# Patient Record
(demographics unavailable — no encounter records)

---

## 2024-11-15 NOTE — REVIEW OF SYSTEMS
[Nasal Discharge] : nasal discharge [Nasal Congestion] : nasal congestion [Cough] : cough [Irritable] : no irritability [Inconsolable] : consolable [Fussy] : not fussy [Eye Redness] : no eye redness [Ear Tugging] : no ear tugging [Tachypnea] : not tachypneic [Wheezing] : no wheezing [Intolerance to feeds] : tolerance to feeds [Spitting Up] : no spitting up [Constipation] : no constipation [Vomiting] : no vomiting [Diarrhea] : no diarrhea [Abnormal Movements] :  no abnormal movements [Jaundice] : no jaundice [Rash] : no rash [Dry Skin] : no dry skin

## 2024-11-15 NOTE — DISCUSSION/SUMMARY
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 Growth and development normal. PE remarkable for nasal congestion and erythema of nasal passages. Maternal depression screen passed. Immunizations UTD.  - Recommend close ENT follow up - Will refer to pediatric pulmonology due to h/o CF carrier  - Please use albuterol or saline nebulized as needed for severe cough or congestion - Routine care & anticipatory guidance given - Vaccines given: Pediarix, Hib, Prevnar & Rotarix - Post vaccine care discussed & potential side effects reviewed - Tylenol or Motrin 4.2ml every 4 hours prn for fever or pain - Continue ad herrera feeds and intro to solids, may advance to stage 2 baby foods - Choking hazards reviewed, no cows milk or honey until after age 1 year old - RTC for 9 month old HCM and prn  Caretaker expressed understanding of the plan and agrees. All questions were answered.

## 2024-11-15 NOTE — DEVELOPMENTAL MILESTONES
[Normal Development] : Normal Development [Pats or smiles at reflection] : pats or smiles at reflection [Begins to turn when name called] : begins to turn when name called [Babbles] : babbles [Rolls over prone to supine] : rolls over prone to supine [Sits briefly without support] : sits briefly without support [Reaches for object and transfers] : reaches for object and transfers [Rakes small object with 4 fingers] : rakes small object with 4 fingers [[place] small object on surface] : bangs small object on surface

## 2024-11-15 NOTE — PHYSICAL EXAM
[Alert] : alert [Normocephalic] : normocephalic [Flat Open Anterior [place]] : flat open anterior fontanelle [Conjunctivae with no discharge] : conjunctivae with no discharge [PERRL] : PERRL [EOMI Bilateral] : EOMI bilateral [Normally Placed Ears] : normally placed ears [Clear Tympanic membranes] : clear tympanic membranes [Light reflex present] : light reflex present [Discharge] : discharge [Nares Patent] : nares patent [Symmetric Chest Rise] : symmetric chest rise [Clear to Auscultation Bilaterally] : clear to auscultation bilaterally [Normoactive Precordium] : normoactive precordium [Regular Rate and Rhythm] : regular rate and rhythm [S1, S2 present] : S1, S2 present [Soft] : soft [Bowel Sounds] : bowel sounds present [Normal External Genitalia] : normal external genitalia [Acute Distress] : no acute distress [Excessive Tearing] : no excessive tearing [Murmurs] : no murmurs [Tender] : nontender [Distended] : nondistended [Syed-Ortolani] : negative Syed-Ortolani [Rash or Lesions] : no rash/lesions [de-identified] : + intranasal erythema

## 2024-11-15 NOTE — HISTORY OF PRESENT ILLNESS
[Formula ___ oz/feed] : [unfilled] oz of formula per feed [Formula ___ oz in 24hrs] : [unfilled] oz of formula in 24 hours [Hours between feeds ___] : Child is fed every [unfilled] hours [Fruits] : fruits [Vegetables] : vegetables [Normal] : Normal [___ voids per day] : [unfilled] voids per day [Frequency of stools: ___] : Frequency of stools: [unfilled]  stools [per day] : per day. [Green/brown] : green/brown [Pasty] : pasty [Firm] : firm consistency [In Bassinet/Crib] : sleeps in bassinet/crib [On back] : sleeps on back [Co-sleeping] : co-sleeping [Sleeps 12-16 hours per 24 hours (including naps)] : sleeps 12-16 hours per 24 hours (including naps) [Tummy time] : tummy time [No] : No cigarette smoke exposure [Water heater temperature set at <120 degrees F] : Water heater temperature set at <120 degrees F [Rear facing car seat in back seat] : Rear facing car seat in back seat [Carbon Monoxide Detectors] : Carbon monoxide detectors at home [Smoke Detectors] : Smoke detectors at home. [NO] : No [Cereal] : no cereal [Egg] : no egg [Meat] : no meat [Fish] : no fish [Peanut] : no peanut [Dairy] : no dairy [Loose bedding, pillow, toys, and/or bumpers in crib] : no loose bedding, pillow, toys, and/or bumpers in crib [Pacifier use] : not using pacifier [Screen time only for video chatting] : screen time not just for video chatting [Exposure to electronic nicotine delivery system] : No exposure to electronic nicotine delivery system [de-identified] : Enfamil gentlease, pureed: sweet potatoes, carrots, banana, peach, green beans, butternut squash, apple, strawberry [FreeTextEntry1] : cough and congestion since birth, saw ENT 1 month ago- inflammation in nasal passages, no laryngomalacia. Prescribed a nasal steroid drop.  Coughs every day, specially at night.   SDOH Screening Questionnaire   SDOH (Social Determinants of Health) Questionnaire: 1. Housing: Do you worry that in the upcoming months, your family, or child, may not have a safe or stable place to live? no 2. Food security: Within the last 12 months, did the food you bought not last and you did not have money to buy more? no 3. Community: Do you need help getting public benefits like food stamps or WIC? no 4. Transportation: Does your child have chronic medical condition and therefore struggle with transportation to attend medical appointments? no 5. Healthcare Access: Do you need help getting health or dental insurance? no       Result: Negative Screen. No further intervention needed.

## 2025-02-21 NOTE — REVIEW OF SYSTEMS
[Irritable] : irritability [Fussy] : fussy [Crying] : crying [Malaise] : malaise [Difficulty with Sleep] : difficulty with sleep [Fever] : fever [Nasal Discharge] : nasal discharge [Nasal Congestion] : nasal congestion [Snoring] : snoring [Mouth Breathing] : mouth breathing [Congestion] : congestion [Appetite Changes] : appetite changes [Rash] : rash [Negative] : Genitourinary [Inconsolable] : consolable [Eye Discharge] : no eye discharge [Eye Redness] : no eye redness [Increased Lacrimation] : no increased lacrimation [Ear Tugging] : no ear tugging [Swollen Gums] : no swollen gums [Tachypnea] : not tachypneic [Wheezing] : no wheezing [Cough] : no cough [Intolerance to feeds] : tolerance to feeds [Spitting Up] : no spitting up [Vomiting] : no vomiting [Diarrhea] : no diarrhea [Constipation] : no constipation [Gaseous] : not gaseous

## 2025-02-21 NOTE — HISTORY OF PRESENT ILLNESS
[Max Temp: ____] : Max temperature: [unfilled] [Fever] : FEVER [EENT/Resp Symptoms] : EENT/RESPIRATORY SYMPTOMS [FreeTextEntry4] : Improved with tylenol and motrin [de-identified] : Fever and congestion x 3 days [FreeTextEntry6] : 9-month-old female presenting with fever and congestion for the past 3 days. T max 103.3. Parents deny cough, vomiting, diarrhea. Parents admit to rash on posterior scalp, decreased appetite and disrupted sleep. Parents deny sick contacts at home.

## 2025-03-03 NOTE — PHYSICAL EXAM
[Warm] : warm [Maculopapular Eruption] : maculopapular eruption [Face] : face [Perioral Region] : perioral region [Cheeks] : cheeks [Neck] : neck [Trunk] : trunk [Arms] : arms [Hands] : hands [Legs] : legs [Feet] : feet [Intertriginous Region] : intertriginous region [Buttocks] : buttocks [NL] : warm, clear

## 2025-03-03 NOTE — DISCUSSION/SUMMARY
[FreeTextEntry1] : 9-month-old female presenting acutely with rash that began on Friday night after her fever broke and appeared on the face and spread down the body. She was seen in office last Friday and noted to have ear infection. She started amoxicillin last night, after development of rash. PE revealed erythematous nonblanching rash on face with associated excoriations and erythematous maculopapular rash to neck, torso, back, b/l arms and legs.  - RVP ordered for etiology of likely viral exanthem; will contact parents with results - CBC ordered for non blanching rash of face, rule out thrombocytopenia - Mother reports recent air travel to [place] and attending a conference of [phone]+ people, infant not yet vaccinated for MMR, will check IgM antibody - Continue with amoxicillin as prescribed and Tylenol/ Motrin prn - Continue to monitor for fevers - RTC 2 days for rash followup - RTC 2 weeks to follow up ear infection  Caretaker expressed understanding of the plan and agreed. All of their questions were answered.

## 2025-03-03 NOTE — HISTORY OF PRESENT ILLNESS
[Derm Symptoms] : DERM SYMPTOMS [___ Day(s)] : [unfilled] day(s) [Constant] : constant [FreeTextEntry7] : Head, torso, back, legs, arms, feet [de-identified] : Rash [FreeTextEntry6] : 9 month old female presenting with rash since Friday night. Patient was seen in our office last Friday afternoon with a fever and diagnosed with Viral URI. RVP/COVID panel was not done.  Fever has resolved and then a rash developed that began on the face and spread down the body. Parents believe that child has scratched at the rash which is why it appears worse on her face. Pt had [place] in the past. No sick contacts. Patient began taking amoxicillin last night. It was resent to the correct pharmacy after it was initially prescribed on 2/21 which is why they only started giving amoxicillin yesterday.  Her appetite and behavior is at her baseline. No difficulty breathing.

## 2025-03-03 NOTE — HISTORY OF PRESENT ILLNESS
[Derm Symptoms] : DERM SYMPTOMS [___ Day(s)] : [unfilled] day(s) [Constant] : constant [FreeTextEntry7] : Head, torso, back, legs, arms, feet [de-identified] : Rash [FreeTextEntry6] : 9 month old female presenting with rash since Friday night. Patient was seen in our office last Friday afternoon with a fever and diagnosed with Viral URI. RVP/COVID panel was not done.  Fever has resolved and then a rash developed that began on the face and spread down the body. Parents believe that child has scratched at the rash which is why it appears worse on her face. Pt had [place] in the past. No sick contacts. Patient began taking amoxicillin last night. It was resent to the correct pharmacy after it was initially prescribed on 2/21 which is why they only started giving amoxicillin yesterday.  Her appetite and behavior is at her baseline. No difficulty breathing.

## 2025-03-25 NOTE — HISTORY OF PRESENT ILLNESS
[Parents] : parents [Formula ___ oz/feed] : [unfilled] oz of formula per feed [Hours between feeds ___] : Child is fed every [unfilled] hours [Fruit] : fruit [Vegetables] : vegetables [Cereal] : cereal [Meat] : meat [Eggs] : eggs [Peanut] : peanut [Dairy] : dairy [Baby food] : baby food [Finger foods] : finger foods [Water] : water [___ voids per day] : [unfilled] voids per day [Frequency of stools: ___] : Frequency of stools: [unfilled]  stools [Loose] : loose consistency [In Crib] : sleeps in crib [On back] : sleeps on back [Co-sleeping] : co-sleeping [Wakes up at night] : wakes up at night [Sleeps 12-16 hours per 24 hours (including naps)] : sleeps 12-16 hours per 24 hours (including naps) [Pacifier use] : Pacifier use [Sippy Cup use] : sippy cup use [Brushing teeth] : brushing teeth [Tap water] : Primary Fluoride Source: Tap water [No] : Not at  exposure [Water heater temperature set at <120 degrees F] : Water heater temperature set at <120 degrees F [Rear facing car seat in  back seat] : Rear facing car seat in  back seat [Carbon Monoxide Detectors] : Carbon monoxide detectors [Smoke Detectors] : Smoke detectors [Up to date] : Up to date [NO] : No [Fish] : no fish [Loose bedding, pillow, toys, and/or bumpers in crib] : no loose bedding, pillow, toys, and/or bumpers in crib [Bottle in bed] : not using bottle in bed [Screen time only for video chatting] : screen time not just for video chatting [de-identified] : 4 feeds of formula done between meals and over night [FreeTextEntry1] : No hospitalizations, ED visits, specialists since last pediatric well visit. Mother has no concerns.  SDOH Screening Questionnaire  SDOH (Social Determinants of Health) Questionnaire:  1. Housing: Do you worry that in the upcoming months, your family, or child, may not have a safe or stable place to live?no  2. Food security: Within the last 12 months, did the food you bought not last and you did not have money to buy more?no  3. Community: Do you need help getting public benefits like food stamps or WIC?no  4. Transportation: Does your child have chronic medical condition and therefore struggle with transportation to attend medical appointments?no  5. Healthcare Access: Do you need help getting health or dental insurance? no    Result: Negative Screen. No further intervention needed.

## 2025-03-25 NOTE — DEVELOPMENTAL MILESTONES
[Uses basic gestures] : uses basic gestures [Says "Raj" or "Mama"] : says "Raj" or "Mama" nonspecifically [Sits well without support] : sits well without support [Transitions between sitting and lying] : transitions between sitting and lying [Balances on hands and knees] : balances on hands and knees [Crawls] : crawls [Picks up small objects with 3 fingers] : picks up small objects with 3 fingers and thumb [Releases objects intentionally] : releases objects intentionally [[place] objects together] : bangs objects together

## 2025-03-25 NOTE — PHYSICAL EXAM
[Alert] : alert [Normocephalic] : normocephalic [Flat Open Anterior [place]] : flat open anterior fontanelle [PERRL] : PERRL [Normally Placed Ears] : normally placed ears [Auricles Well Formed] : auricles well formed [Clear Tympanic membranes] : clear tympanic membranes [Light reflex present] : light reflex present [Bony landmarks visible] : bony landmarks visible [Nares Patent] : nares patent [Palate Intact] : palate intact [Uvula Midline] : uvula midline [Supple, full passive range of motion] : supple, full passive range of motion [Symmetric Chest Rise] : symmetric chest rise [Clear to Auscultation Bilaterally] : clear to auscultation bilaterally [Regular Rate and Rhythm] : regular rate and rhythm [S1, S2 present] : S1, S2 present [+2 Femoral Pulses] : (+) 2 femoral pulses [Soft] : soft [Bowel Sounds] : bowel sounds present [Normal External Genitalia] : normal external genitalia [Normal Vaginal Introitus] : normal vaginal introitus [No Abnormal Lymph Nodes Palpated] : no abnormal lymph nodes palpated [Symmetric abduction and rotation of hips] : symmetric abduction and rotation of hips [Symmetric Buttocks Creases] : symmetric buttocks creases [Straight] : straight [Cranial Nerves Grossly Intact] : cranial nerves grossly intact [Acute Distress] : no acute distress [Excessive Tearing] : no excessive tearing [Discharge] : no discharge [Palpable Masses] : no palpable masses [Murmurs] : no murmurs [Tender] : nontender [Distended] : nondistended [Hepatomegaly] : no hepatomegaly [Splenomegaly] : no splenomegaly [Clitoromegaly] : no clitoromegaly [Rash or Lesions] : no rash/lesions

## 2025-03-25 NOTE — DISCUSSION/SUMMARY
[FreeTextEntry1] : 9 month old F presenting for HCM. Growth and development normal. PE remarkable/unremarkable. Immunizations UTD.  - Routine care & anticipatory guidance given - Continue ad herrera feeds and intro to solids, may advance to finger foods - Choking hazards reviewed, no cows milk or honey until after age 1 year old - RTC for 12 month old HCM and prn  Caretaker expressed understanding of the plan and agrees. All questions were answered.

## 2025-05-09 NOTE — HISTORY OF PRESENT ILLNESS
[Mother] : mother [Hours between feeds ___] : Child is fed every [unfilled] hours [___ Feeding per 24 hrs] : a  total of [unfilled] feedings in 24 hours [Formula ___ oz/feed] : [unfilled] oz of formula per feed [Fruit] : fruit [Vegetables] : vegetables [Meat] : meat [Dairy] : dairy [Baby food] : baby food [Finger food] : finger food [Table food] : table food [___ stools per day] : [unfilled]  stools per day [___ voids per day] : [unfilled] voids per day [Normal] : Normal [On back] : On back [In crib] : In crib [Wakes up at night] : Wakes up at night [Pacifier use] : Pacifier use [Sippy cup use] : Sippy cup use [Brushing teeth] : Brushing teeth [Bottle in bed] : Bottle in bed [Toothpaste] : Primary Fluoride Source: Toothpaste [Playtime] : Playtime  [No] : Not at  exposure [Water heater temperature set at <120 degrees F] : Water heater temperature set at <120 degrees F [Car seat in back seat] : Car seat in back seat [Smoke Detectors] : Smoke detectors [Carbon Monoxide Detectors] : Carbon monoxide detectors [At risk for exposure to TB] : At risk for exposure to Tuberculosis [NO] : No [Exposure to electronic nicotine delivery system] : No exposure to electronic nicotine delivery system [de-identified] : soy milk 6 [FreeTextEntry8] : shaq [FreeTextEntry1] : SDOH Screening Questionnaire  SDOH (Social Determinants of Health) Questionnaire: 1. Housing: Do you worry that in the upcoming months, your family, or child, may not have a safe or stable place to live? no 2. Food security: Within the last 12 months, did the food you bought not last and you did not have money to buy more? no 3. Community: Do you need help getting public benefits like food stamps or WIC? no 4. Transportation: Does your child have chronic medical condition and therefore struggle with transportation to attend medical appointments? no 5. Healthcare Access: Do you need help getting health or dental insurance? no  Result: Negative Screen. No further intervention needed.

## 2025-05-09 NOTE — REVIEW OF SYSTEMS
[Irritable] : no irritability [Fussy] : not fussy [Eye Discharge] : no eye discharge [Eye Redness] : no eye redness [Ear Tugging] : no ear tugging [Nasal Discharge] : no nasal discharge [Nasal Congestion] : no nasal congestion [Cough] : no cough [Intolerance to feeds] : tolerance to feeds [Constipation] : no constipation [Vomiting] : no vomiting [Diarrhea] : no diarrhea [Rash] : no rash [Dry Skin] : no dry skin

## 2025-05-09 NOTE — DISCUSSION/SUMMARY
[FreeTextEntry1] : 12-month-old F presenting for HCM. Vitals reviewed and within normal limits for age. Growth and development normal. PE unremarkable. No feeding or elimination concerns. Parents prefer to delay Hepatitis A vaccine at this time, will receive MMR and Varicella today.  - Routine care & anticipatory guidance given - CBC & lead to be done - Vaccines given: MMR & Varicella - Post vaccine care discussed & potential side effects reviewed - Tylenol every 4 hours prn or Motrin every 6 hours prn for pain or fever - Counseled on introduction of cow's milk & possibility of temporary constipation during transitioning, may use prune juice for relief - Choking hazards reviewed - RTC for 15-month-old HCM and PRN  Caretaker expressed understanding of the plan and agrees. All questions were answered.

## 2025-05-09 NOTE — PHYSICAL EXAM
[Alert] : alert [Crying] : crying [Consolable] : consolable [Normocephalic] : normocephalic [Excessive Tearing] : no excessive tearing [PERRL] : PERRL [Normally Placed Ears] : normally placed ears [Auricles Well Formed] : auricles well formed [Clear Tympanic membranes] : clear tympanic membranes [Light reflex present] : light reflex present [Bony landmarks visible] : bony landmarks visible [Discharge] : no discharge [Nares Patent] : nares patent [Palate Intact] : palate intact [Tooth Eruption] : tooth eruption [Supple, full passive range of motion] : supple, full passive range of motion [Palpable Masses] : no palpable masses [Symmetric Chest Rise] : symmetric chest rise [Clear to Auscultation Bilaterally] : clear to auscultation bilaterally [Regular Rate and Rhythm] : regular rate and rhythm [S1, S2 present] : S1, S2 present [Murmurs] : no murmurs [+2 Femoral Pulses] : (+) 2 femoral pulses [Soft] : soft [Tender] : nontender [Distended] : nondistended [Bowel Sounds] : normoactive bowel sounds [Hepatomegaly] : no hepatomegaly [Splenomegaly] : no splenomegaly [Normal External Genitalia] : normal external genitalia [Clitoromegaly] : no clitoromegaly [Normal Vaginal Introitus] : normal vaginal introitus [No Abnormal Lymph Nodes Palpated] : no abnormal lymph nodes palpated [Preauricular] : preauricular [Post Auricular] : post auricular [Submandibular] : submandibular [Submental] : submental [Anterior Cervical] : anterior cervical [Posterior Cervical] : posterior cervical [Rash or Lesions] : no rash/lesions